# Patient Record
Sex: FEMALE | Race: BLACK OR AFRICAN AMERICAN
[De-identification: names, ages, dates, MRNs, and addresses within clinical notes are randomized per-mention and may not be internally consistent; named-entity substitution may affect disease eponyms.]

---

## 2017-03-02 ENCOUNTER — HOSPITAL ENCOUNTER (OUTPATIENT)
Dept: HOSPITAL 61 - ENDOS | Age: 82
Discharge: HOME | End: 2017-03-02
Attending: INTERNAL MEDICINE
Payer: MEDICARE

## 2017-03-02 VITALS
SYSTOLIC BLOOD PRESSURE: 170 MMHG | DIASTOLIC BLOOD PRESSURE: 107 MMHG | SYSTOLIC BLOOD PRESSURE: 170 MMHG | DIASTOLIC BLOOD PRESSURE: 107 MMHG | SYSTOLIC BLOOD PRESSURE: 170 MMHG | DIASTOLIC BLOOD PRESSURE: 107 MMHG

## 2017-03-02 DIAGNOSIS — K64.0: Primary | ICD-10-CM

## 2017-03-02 DIAGNOSIS — K57.30: ICD-10-CM

## 2017-03-02 DIAGNOSIS — Z98.51: ICD-10-CM

## 2017-03-02 DIAGNOSIS — Z90.710: ICD-10-CM

## 2017-03-02 DIAGNOSIS — E03.9: ICD-10-CM

## 2017-03-02 DIAGNOSIS — I10: ICD-10-CM

## 2017-03-02 DIAGNOSIS — M19.90: ICD-10-CM

## 2017-03-02 DIAGNOSIS — Z85.850: ICD-10-CM

## 2017-03-02 PROCEDURE — 45378 DIAGNOSTIC COLONOSCOPY: CPT

## 2017-03-14 ENCOUNTER — HOSPITAL ENCOUNTER (OUTPATIENT)
Dept: HOSPITAL 61 - ECHO | Age: 82
Discharge: HOME | End: 2017-03-14
Attending: INTERNAL MEDICINE
Payer: MEDICARE

## 2017-03-14 DIAGNOSIS — I71.00: Primary | ICD-10-CM

## 2017-03-14 DIAGNOSIS — R01.1: ICD-10-CM

## 2017-03-14 PROCEDURE — 93306 TTE W/DOPPLER COMPLETE: CPT

## 2017-05-15 ENCOUNTER — HOSPITAL ENCOUNTER (OUTPATIENT)
Dept: HOSPITAL 61 - NM | Age: 82
Discharge: HOME | End: 2017-05-15
Attending: INTERNAL MEDICINE
Payer: MEDICARE

## 2017-05-15 DIAGNOSIS — Z85.3: ICD-10-CM

## 2017-05-15 DIAGNOSIS — I10: ICD-10-CM

## 2017-05-15 DIAGNOSIS — C50.912: Primary | ICD-10-CM

## 2017-05-15 DIAGNOSIS — Z79.01: ICD-10-CM

## 2017-05-15 PROCEDURE — 71250 CT THORAX DX C-: CPT

## 2017-05-15 PROCEDURE — 96374 THER/PROPH/DIAG INJ IV PUSH: CPT

## 2017-05-15 PROCEDURE — 74176 CT ABD & PELVIS W/O CONTRAST: CPT

## 2017-05-15 PROCEDURE — 74178 CT ABD&PLV WO CNTR FLWD CNTR: CPT

## 2017-05-15 PROCEDURE — A9503 TC99M MEDRONATE: HCPCS

## 2017-05-15 PROCEDURE — 78306 BONE IMAGING WHOLE BODY: CPT

## 2017-05-15 NOTE — RAD
CT of the chest, abdomen and pelvis without contrast, 5/15/2017:



History: Staging breast cancer



No IV contrast was administered as requested. Oral contrast material was given

for GI tract opacification.



The heart is enlarged. A previous study from 4/4/2014 demonstrated a chronic

aortic dissection with postsurgical change involving the ascending aorta. The

aortic lumen is not adequately delineated on these noncontrast scans. The

degree of aortic enlargement appears to be unchanged. There are calcified

mediastinal lymph nodes. No mediastinal or axillary adenopathy is seen.



There is minimal pleural thickening posteriorly suggesting a trace amount of

pleural fluid in conjunction with scarring. No pulmonary mass is identified.

There is mild linear scarring or atelectasis in the lung bases.



The gallbladder is surgically absent. The unopacified liver shows no definite

mass. Mild intrahepatic biliary ductal prominence is probably unchanged,

although less well seen on these noncontrast images. The pancreatic head

cannot be  from the unopacified duodenum. The spleen is unremarkable.



The unopacified kidneys show no evidence of obstruction. There are low a

medium density renal nodules probably representing a combination of simple and

complicated renal cysts. The largest of these in the upper pole of the right

kidney demonstrates rim-like calcification. Similar findings were present on

the exam from 12/1/2016. The possibility of one or more of these nodules being

solid cannot be entirely excluded.



The patient's known chronic abdominal aortic dissection is not adequately

visualized on these noncontrast scans. An inferior vena cava filter is in

place in an infrarenal location. No abdominal or pelvic adenopathy is seen.



There is a small amount of ascites in the abdomen and pelvis. Colonic

diverticula are present, most numerous in the sigmoid region. No paracolonic

inflammatory process is seen. There is no evidence of bowel obstruction. No

free air is evident in the abdomen or pelvis.



Moderate multilevel degenerative changes are present in the spine.





IMPRESSION:

1. Chronic aortic dissection.

2. Bilateral renal masses probably representing a combination of simple and

complicated renal cysts.

3. Colonic diverticulosis.

4. Trace amount of bilateral pleural fluid.

5. Small volume of ascites.

6. No specific evidence of metastatic disease on this limited exam.











PQRS Compliance Statement:



One or more of the following individualized dose reduction techniques were

utilized for this examination:

1. Automated exposure control

2. Adjustment of the mA and/or kV according to patient size

3. Use of iterative reconstruction technique

## 2017-05-15 NOTE — RAD
Radionuclide bone scan, 5/15/2017:



History: Breast cancer



Whole body imaging was performed following IV injection of 25 mCi of

technetium 99m MDP. Comparison is made to a study from 1/28/2013. The

following findings are delineated:



1. Several areas of mildly increased activity are noted in the lumbar spine.

Correlation with the current CT study suggests that these findings are likely

on an arthritic basis.

2. Moderately increased activity at the right knee was also present on the

previous study and is presumably arthritic in nature. 

3. There is increased activity at the midsternal level. Correlation with the

recent CT study suggests that this is probably related to an incompletely

fused median sternotomy site. Increased activity in the sternoclavicular

regions is probably arthritic.

4. Increased activity at both shoulders is probably arthritic in nature.

5. No other significant osseous abnormality is seen.





IMPRESSION: Scattered abnormalities as described above are most likely

arthritic in nature. There is no convincing evidence of multifocal osseous

metastatic disease.

## 2017-08-06 VITALS — DIASTOLIC BLOOD PRESSURE: 68 MMHG | SYSTOLIC BLOOD PRESSURE: 122 MMHG

## 2017-08-10 ENCOUNTER — HOSPITAL ENCOUNTER (OUTPATIENT)
Dept: HOSPITAL 61 - SPEC | Age: 82
Discharge: HOME | End: 2017-08-10
Attending: FAMILY MEDICINE
Payer: MEDICARE

## 2017-08-10 DIAGNOSIS — I82.499: Primary | ICD-10-CM

## 2017-08-10 LAB
INR PPP: 1.6 (ref 0.8–1.1)
PROTHROMBIN TIME: 17.7 SEC (ref 11.7–14)

## 2017-08-10 PROCEDURE — 36415 COLL VENOUS BLD VENIPUNCTURE: CPT

## 2017-08-10 PROCEDURE — 85610 PROTHROMBIN TIME: CPT

## 2017-11-09 VITALS — DIASTOLIC BLOOD PRESSURE: 80 MMHG | SYSTOLIC BLOOD PRESSURE: 124 MMHG

## 2017-12-19 ENCOUNTER — HOSPITAL ENCOUNTER (OUTPATIENT)
Dept: HOSPITAL 61 - MAMMO | Age: 82
Discharge: HOME | End: 2017-12-19
Attending: FAMILY MEDICINE
Payer: MEDICARE

## 2017-12-19 DIAGNOSIS — Z12.31: Primary | ICD-10-CM

## 2017-12-19 NOTE — RAD
DATE: 12/19/2017



EXAM: DIGITAL SCREEN BILAT W/CAD



HISTORY: Routine screening.   



COMPARISON: 12/12/2016 and 11/11/2015.



This study was interpreted with the benefit of Computerized Aided Detection

(CAD).







FINDINGS: There is a new parenchymal density identified in the left breast

just lateral to the nipple line on the CC view at anterior depth. No

corresponding density on the MLO view is identified. Extensive vascular and

parenchymal calcifications are seen. The axillae are unremarkable. 





Breast Density:  DENSE The breast parenchyma is dense, which could reduce the

sensitivity of mammography. Breast parenchyma level density D. 





IMPRESSION: 



Left breast density, new when compared with prior exam. Additional views are

recommended.







BI-RADS CATEGORY: 0 INCOMPLETE: NEEDS ADDITIONAL IMAGING EVALUATION AND/OR

PRIOR MAMMOGRAMS FOR COMPARISON.



RECOMMENDED FOLLOW-UP: ADD ADDITIONAL IMAGING



PQRS compliance statement: Patient information was entered into a reminder

system with a target due date     for the next mammogram.



Mammography is a sensitive method for finding small breast cancers, but it

does not detect them all and is not a substitute for careful clinical

examination.  A negative mammogram does not negate a clinically suspicious

finding and should not result in delay in biopsying a clinically suspicious

abnormality.



"Our facility is accredited by the [employer] Mammography

Program."

## 2017-12-29 ENCOUNTER — HOSPITAL ENCOUNTER (OUTPATIENT)
Dept: HOSPITAL 61 - MAMMO | Age: 82
Discharge: HOME | End: 2017-12-29
Attending: FAMILY MEDICINE
Payer: MEDICARE

## 2017-12-29 DIAGNOSIS — Z85.3: ICD-10-CM

## 2017-12-29 DIAGNOSIS — N63.20: Primary | ICD-10-CM

## 2017-12-29 PROCEDURE — 76641 ULTRASOUND BREAST COMPLETE: CPT

## 2018-01-09 ENCOUNTER — HOSPITAL ENCOUNTER (OUTPATIENT)
Dept: HOSPITAL 61 - US | Age: 83
Discharge: HOME | End: 2018-01-09
Attending: FAMILY MEDICINE
Payer: MEDICARE

## 2018-01-09 DIAGNOSIS — I10: ICD-10-CM

## 2018-01-09 DIAGNOSIS — C50.912: Primary | ICD-10-CM

## 2018-01-09 DIAGNOSIS — E11.9: ICD-10-CM

## 2018-01-09 PROCEDURE — 77065 DX MAMMO INCL CAD UNI: CPT

## 2018-01-09 PROCEDURE — 19081 BX BREAST 1ST LESION STRTCTC: CPT

## 2018-01-09 PROCEDURE — 76942 ECHO GUIDE FOR BIOPSY: CPT

## 2018-01-09 PROCEDURE — 88305 TISSUE EXAM BY PATHOLOGIST: CPT

## 2018-03-16 ENCOUNTER — HOSPITAL ENCOUNTER (OUTPATIENT)
Dept: HOSPITAL 61 - MAMMO | Age: 83
Discharge: HOME | End: 2018-03-16
Attending: FAMILY MEDICINE
Payer: MEDICARE

## 2018-03-16 DIAGNOSIS — C50.912: Primary | ICD-10-CM

## 2018-03-16 PROCEDURE — 76641 ULTRASOUND BREAST COMPLETE: CPT

## 2018-03-16 PROCEDURE — 77065 DX MAMMO INCL CAD UNI: CPT

## 2018-05-08 ENCOUNTER — HOSPITAL ENCOUNTER (INPATIENT)
Dept: HOSPITAL 61 - ER | Age: 83
LOS: 2 days | Discharge: HOME HEALTH | DRG: 177 | End: 2018-05-10
Attending: FAMILY MEDICINE | Admitting: FAMILY MEDICINE
Payer: MEDICARE

## 2018-05-08 DIAGNOSIS — E44.0: ICD-10-CM

## 2018-05-08 DIAGNOSIS — J15.6: Primary | ICD-10-CM

## 2018-05-08 DIAGNOSIS — Z85.850: ICD-10-CM

## 2018-05-08 DIAGNOSIS — E89.0: ICD-10-CM

## 2018-05-08 DIAGNOSIS — Z90.710: ICD-10-CM

## 2018-05-08 DIAGNOSIS — Z85.3: ICD-10-CM

## 2018-05-08 DIAGNOSIS — Z82.3: ICD-10-CM

## 2018-05-08 DIAGNOSIS — K21.9: ICD-10-CM

## 2018-05-08 DIAGNOSIS — Z83.3: ICD-10-CM

## 2018-05-08 DIAGNOSIS — E11.22: ICD-10-CM

## 2018-05-08 DIAGNOSIS — Z82.49: ICD-10-CM

## 2018-05-08 DIAGNOSIS — Z98.49: ICD-10-CM

## 2018-05-08 DIAGNOSIS — G93.41: ICD-10-CM

## 2018-05-08 DIAGNOSIS — N18.4: ICD-10-CM

## 2018-05-08 DIAGNOSIS — I12.9: ICD-10-CM

## 2018-05-08 DIAGNOSIS — Z90.49: ICD-10-CM

## 2018-05-08 LAB
% BANDS: 16 % (ref 0–9)
% LYMPHS: 9 % (ref 24–48)
% MONOS: 2 % (ref 0–10)
% SEGS: 73 % (ref 35–66)
ADD MAN DIFF?: YES
ALBUMIN SERPL-MCNC: 2.8 G/DL (ref 3.4–5)
ALBUMIN/GLOB SERPL: 0.5 {RATIO} (ref 1–1.7)
ALP SERPL-CCNC: 147 U/L (ref 46–116)
ALT (SGPT): 15 U/L (ref 14–59)
ANION GAP SERPL CALC-SCNC: 10 MMOL/L (ref 6–14)
AST SERPL-CCNC: 30 U/L (ref 15–37)
BACTERIA,URINE: 0 /HPF
BASO #: 0 X10^3/UL (ref 0–0.2)
BASO %: 0 % (ref 0–3)
BILIRUBIN,URINE: (no result)
BLOOD UREA NITROGEN: 22 MG/DL (ref 7–20)
BUN/CREAT SERPL: 11 (ref 6–20)
CALCIUM: 9.5 MG/DL (ref 8.5–10.1)
CHLORIDE: 100 MMOL/L (ref 98–107)
CLARITY,URINE: CLEAR
CO2 SERPL-SCNC: 27 MMOL/L (ref 21–32)
COLOR,URINE: (no result)
CREAT SERPL-MCNC: 2 MG/DL (ref 0.6–1)
EOS #: 0 X10^3/UL (ref 0–0.7)
EOS %: 0 % (ref 0–3)
GFR SERPLBLD BASED ON 1.73 SQ M-ARVRAT: 28.4 ML/MIN
GLOBULIN SER-MCNC: 5.7 G/DL (ref 2.2–3.8)
GLUCOSE SERPL-MCNC: 128 MG/DL (ref 70–99)
GLUCOSE,URINE: NEGATIVE MG/DL
HCG SERPL-ACNC: 6.6 X10^3/UL (ref 4–11)
HEMATOCRIT: 27.9 % (ref 36–47)
HEMOGLOBIN: 10.1 G/DL (ref 12–15.5)
HYALINE CASTS, URINE: (no result) /HPF
INFLUENZA A PATIENT: NEGATIVE
INFLUENZA B PATIENT: NEGATIVE
LACTIC ACID: 1.1 MMOL/L (ref 0.4–2)
LYMPH #: 0.3 X10^3/UL (ref 1–4.8)
LYMPH %: 5 % (ref 24–48)
MEAN CORPUSCULAR HEMOGLOBIN: 35 PG (ref 25–35)
MEAN CORPUSCULAR HGB CONC: 36 G/DL (ref 31–37)
MEAN CORPUSCULAR VOLUME: 95 FL (ref 79–100)
MONO #: 0.2 X10^3/UL (ref 0–1.1)
MONO %: 4 % (ref 0–9)
NEUT #: 6 X10^3UL (ref 1.8–7.7)
NEUT %: 91 % (ref 31–73)
NITRITE,URINE: NEGATIVE
OBC FLU: (no result)
PH,URINE: 6.5
PLATELET COUNT: 171 X10^3/UL (ref 140–400)
PLT ESTIMATE: ADEQUATE
POTASSIUM SERPL-SCNC: 3.4 MMOL/L (ref 3.5–5.1)
PROTEIN,URINE: >=300 MG/DL
RBC,URINE: (no result) /HPF (ref 0–2)
RED BLOOD COUNT: 2.92 X10^6/UL (ref 3.5–5.4)
RED CELL DISTRIBUTION WIDTH: 15 % (ref 11.5–14.5)
SODIUM: 137 MMOL/L (ref 136–145)
SPECIFIC GRAVITY,URINE: 1.01
SQUAMOUS EPITHELIAL CELL,UR: (no result) /LPF
TOTAL BILIRUBIN: 2 MG/DL (ref 0.2–1)
TOTAL PROTEIN: 8.5 G/DL (ref 6.4–8.2)
TROPONINI: < 0.017 NG/ML (ref 0–0.06)
UROBILINOGEN,URINE: 1 MG/DL
WBC,URINE: (no result) /HPF (ref 0–4)

## 2018-05-08 PROCEDURE — 70450 CT HEAD/BRAIN W/O DYE: CPT

## 2018-05-08 PROCEDURE — 97166 OT EVAL MOD COMPLEX 45 MIN: CPT

## 2018-05-08 PROCEDURE — 87040 BLOOD CULTURE FOR BACTERIA: CPT

## 2018-05-08 PROCEDURE — 83605 ASSAY OF LACTIC ACID: CPT

## 2018-05-08 PROCEDURE — 96361 HYDRATE IV INFUSION ADD-ON: CPT

## 2018-05-08 PROCEDURE — 71045 X-RAY EXAM CHEST 1 VIEW: CPT

## 2018-05-08 PROCEDURE — 97116 GAIT TRAINING THERAPY: CPT

## 2018-05-08 PROCEDURE — 85025 COMPLETE CBC W/AUTO DIFF WBC: CPT

## 2018-05-08 PROCEDURE — 96375 TX/PRO/DX INJ NEW DRUG ADDON: CPT

## 2018-05-08 PROCEDURE — 97110 THERAPEUTIC EXERCISES: CPT

## 2018-05-08 PROCEDURE — 81001 URINALYSIS AUTO W/SCOPE: CPT

## 2018-05-08 PROCEDURE — 80053 COMPREHEN METABOLIC PANEL: CPT

## 2018-05-08 PROCEDURE — 36415 COLL VENOUS BLD VENIPUNCTURE: CPT

## 2018-05-08 PROCEDURE — 97530 THERAPEUTIC ACTIVITIES: CPT

## 2018-05-08 PROCEDURE — 87804 INFLUENZA ASSAY W/OPTIC: CPT

## 2018-05-08 PROCEDURE — 84484 ASSAY OF TROPONIN QUANT: CPT

## 2018-05-08 PROCEDURE — 97162 PT EVAL MOD COMPLEX 30 MIN: CPT

## 2018-05-08 PROCEDURE — 93005 ELECTROCARDIOGRAM TRACING: CPT

## 2018-05-08 PROCEDURE — 99285 EMERGENCY DEPT VISIT HI MDM: CPT

## 2018-05-08 PROCEDURE — 96365 THER/PROPH/DIAG IV INF INIT: CPT

## 2018-05-08 PROCEDURE — 80048 BASIC METABOLIC PNL TOTAL CA: CPT

## 2018-05-08 PROCEDURE — 85007 BL SMEAR W/DIFF WBC COUNT: CPT

## 2018-05-08 RX ADMIN — AZITHROMYCIN MONOHYDRATE 1 MLS/HR: 500 INJECTION, POWDER, LYOPHILIZED, FOR SOLUTION INTRAVENOUS at 23:03

## 2018-05-08 RX ADMIN — ACETAMINOPHEN 1 MG: 325 TABLET, FILM COATED ORAL at 21:14

## 2018-05-08 RX ADMIN — BACITRACIN 1 MLS/HR: 5000 INJECTION, POWDER, FOR SOLUTION INTRAMUSCULAR at 21:14

## 2018-05-08 RX ADMIN — CEFTRIAXONE 1 GM: 1 INJECTION, POWDER, FOR SOLUTION INTRAMUSCULAR; INTRAVENOUS at 23:03

## 2018-05-09 RX ADMIN — Medication 1 MG: at 21:00

## 2018-05-09 RX ADMIN — METOPROLOL SUCCINATE 1 MG: 50 TABLET, EXTENDED RELEASE ORAL at 10:20

## 2018-05-09 RX ADMIN — CALCIUM CARBONATE-VITAMIN D TAB 500 MG-200 UNIT 1 TAB: 500-200 TAB at 18:11

## 2018-05-09 RX ADMIN — Medication 1 MG: at 18:11

## 2018-05-09 RX ADMIN — LEVOTHYROXINE SODIUM 1 MCG: 150 TABLET ORAL at 10:21

## 2018-05-09 RX ADMIN — Medication 1 MG: at 09:00

## 2018-05-09 RX ADMIN — HEPARIN SODIUM 1 UNIT: 5000 INJECTION, SOLUTION INTRAVENOUS; SUBCUTANEOUS at 09:00

## 2018-05-09 RX ADMIN — LOSARTAN POTASSIUM 1 MG: 50 TABLET ORAL at 10:22

## 2018-05-09 RX ADMIN — POTASSIUM CHLORIDE 1 MEQ: 750 TABLET, FILM COATED, EXTENDED RELEASE ORAL at 10:23

## 2018-05-09 RX ADMIN — Medication 1 CAP: at 21:14

## 2018-05-09 RX ADMIN — CALCIUM CARBONATE-VITAMIN D TAB 500 MG-200 UNIT 1 TAB: 500-200 TAB at 10:19

## 2018-05-09 RX ADMIN — PANTOPRAZOLE SODIUM 1 MG: 40 TABLET, DELAYED RELEASE ORAL at 10:22

## 2018-05-09 RX ADMIN — HEPARIN SODIUM 1 UNIT: 5000 INJECTION, SOLUTION INTRAVENOUS; SUBCUTANEOUS at 21:00

## 2018-05-10 LAB
ADD MAN DIFF?: NO
ANION GAP SERPL CALC-SCNC: 6 MMOL/L (ref 6–14)
BASO #: 0 X10^3/UL (ref 0–0.2)
BASO %: 0 % (ref 0–3)
BLOOD UREA NITROGEN: 24 MG/DL (ref 7–20)
CALCIUM: 8.4 MG/DL (ref 8.5–10.1)
CHLORIDE: 105 MMOL/L (ref 98–107)
CO2 SERPL-SCNC: 29 MMOL/L (ref 21–32)
CREAT SERPL-MCNC: 1.7 MG/DL (ref 0.6–1)
EOS #: 0.1 X10^3/UL (ref 0–0.7)
EOS %: 2 % (ref 0–3)
GFR SERPLBLD BASED ON 1.73 SQ M-ARVRAT: 34.2 ML/MIN
GLUCOSE SERPL-MCNC: 81 MG/DL (ref 70–99)
HCG SERPL-ACNC: 5 X10^3/UL (ref 4–11)
HEMATOCRIT: 26 % (ref 36–47)
HEMOGLOBIN: 9.4 G/DL (ref 12–15.5)
LYMPH #: 0.7 X10^3/UL (ref 1–4.8)
LYMPH %: 15 % (ref 24–48)
MEAN CORPUSCULAR HEMOGLOBIN: 35 PG (ref 25–35)
MEAN CORPUSCULAR HGB CONC: 36 G/DL (ref 31–37)
MEAN CORPUSCULAR VOLUME: 96 FL (ref 79–100)
MONO #: 0.5 X10^3/UL (ref 0–1.1)
MONO %: 10 % (ref 0–9)
NEUT #: 3.7 X10^3UL (ref 1.8–7.7)
NEUT %: 73 % (ref 31–73)
PLATELET COUNT: 148 X10^3/UL (ref 140–400)
POTASSIUM SERPL-SCNC: 3.6 MMOL/L (ref 3.5–5.1)
RED BLOOD COUNT: 2.7 X10^6/UL (ref 3.5–5.4)
RED CELL DISTRIBUTION WIDTH: 15.1 % (ref 11.5–14.5)
SODIUM: 140 MMOL/L (ref 136–145)

## 2018-05-10 RX ADMIN — METOPROLOL SUCCINATE 1 MG: 50 TABLET, EXTENDED RELEASE ORAL at 08:44

## 2018-05-10 RX ADMIN — HEPARIN SODIUM 1 UNIT: 5000 INJECTION, SOLUTION INTRAVENOUS; SUBCUTANEOUS at 09:00

## 2018-05-10 RX ADMIN — Medication 1 MG: at 08:40

## 2018-05-10 RX ADMIN — PANTOPRAZOLE SODIUM 1 MG: 40 TABLET, DELAYED RELEASE ORAL at 08:44

## 2018-05-10 RX ADMIN — CALCIUM CARBONATE-VITAMIN D TAB 500 MG-200 UNIT 1 TAB: 500-200 TAB at 08:41

## 2018-05-10 RX ADMIN — Medication 1 CAP: at 08:43

## 2018-05-10 RX ADMIN — Medication 1 MG: at 14:15

## 2018-05-10 RX ADMIN — LEVOTHYROXINE SODIUM 1 MCG: 150 TABLET ORAL at 08:43

## 2018-05-10 RX ADMIN — CALCIUM CARBONATE-VITAMIN D TAB 500 MG-200 UNIT 1 TAB: 500-200 TAB at 14:15

## 2018-05-10 RX ADMIN — POTASSIUM CHLORIDE 1 MEQ: 750 TABLET, FILM COATED, EXTENDED RELEASE ORAL at 08:41

## 2018-08-08 ENCOUNTER — HOSPITAL ENCOUNTER (EMERGENCY)
Dept: HOSPITAL 61 - ER | Age: 83
Discharge: HOME | End: 2018-08-08
Payer: MEDICARE

## 2018-08-08 DIAGNOSIS — I10: ICD-10-CM

## 2018-08-08 DIAGNOSIS — M25.511: Primary | ICD-10-CM

## 2018-08-08 DIAGNOSIS — Z88.8: ICD-10-CM

## 2018-08-08 DIAGNOSIS — E11.9: ICD-10-CM
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
## 2018-08-08 DIAGNOSIS — Z86.718: ICD-10-CM

## 2018-08-08 DIAGNOSIS — E03.9: ICD-10-CM

## 2018-08-08 PROCEDURE — 73030 X-RAY EXAM OF SHOULDER: CPT

## 2018-08-08 PROCEDURE — 99284 EMERGENCY DEPT VISIT MOD MDM: CPT
